# Patient Record
Sex: MALE | Race: ASIAN | NOT HISPANIC OR LATINO | URBAN - METROPOLITAN AREA
[De-identification: names, ages, dates, MRNs, and addresses within clinical notes are randomized per-mention and may not be internally consistent; named-entity substitution may affect disease eponyms.]

---

## 2018-01-08 ENCOUNTER — EMERGENCY (EMERGENCY)
Facility: HOSPITAL | Age: 43
LOS: 1 days | Discharge: HOME | End: 2018-01-08
Attending: EMERGENCY MEDICINE | Admitting: EMERGENCY MEDICINE
Payer: SELF-PAY

## 2018-01-08 VITALS
WEIGHT: 214.95 LBS | TEMPERATURE: 98 F | SYSTOLIC BLOOD PRESSURE: 131 MMHG | OXYGEN SATURATION: 97 % | HEART RATE: 70 BPM | RESPIRATION RATE: 16 BRPM | DIASTOLIC BLOOD PRESSURE: 85 MMHG

## 2018-01-08 VITALS
SYSTOLIC BLOOD PRESSURE: 135 MMHG | OXYGEN SATURATION: 97 % | RESPIRATION RATE: 17 BRPM | HEART RATE: 61 BPM | DIASTOLIC BLOOD PRESSURE: 91 MMHG | TEMPERATURE: 99 F

## 2018-01-08 PROCEDURE — 99284 EMERGENCY DEPT VISIT MOD MDM: CPT | Mod: 25

## 2018-01-08 PROCEDURE — 93005 ELECTROCARDIOGRAM TRACING: CPT

## 2018-01-08 PROCEDURE — 93010 ELECTROCARDIOGRAM REPORT: CPT

## 2018-01-08 PROCEDURE — 99283 EMERGENCY DEPT VISIT LOW MDM: CPT | Mod: 25

## 2018-01-08 RX ORDER — GABAPENTIN 400 MG/1
1 CAPSULE ORAL
Qty: 21 | Refills: 0 | OUTPATIENT
Start: 2018-01-08 | End: 2018-01-14

## 2018-01-08 RX ORDER — OMEPRAZOLE 10 MG/1
0 CAPSULE, DELAYED RELEASE ORAL
Qty: 0 | Refills: 0 | COMMUNITY

## 2018-01-08 RX ORDER — GABAPENTIN 400 MG/1
300 CAPSULE ORAL ONCE
Qty: 0 | Refills: 0 | Status: COMPLETED | OUTPATIENT
Start: 2018-01-08 | End: 2018-01-08

## 2018-01-08 RX ORDER — LISINOPRIL 2.5 MG/1
0 TABLET ORAL
Qty: 0 | Refills: 0 | COMMUNITY

## 2018-01-08 RX ADMIN — GABAPENTIN 300 MILLIGRAM(S): 400 CAPSULE ORAL at 16:39

## 2018-01-08 RX ADMIN — Medication 60 MILLIGRAM(S): at 16:39

## 2018-01-08 NOTE — ED ADULT NURSE NOTE - CHPI ED SYMPTOMS NEG
no blurred vision/no weakness/no confusion/no loss of consciousness/no nausea/no dizziness/no fever/no vomiting/no change in level of consciousness

## 2018-01-08 NOTE — ED PROVIDER NOTE - OBJECTIVE STATEMENT
41 y/o M 41 y/o M hx of HTN, DM2 presents with right shoulder and anterior chest discomfort of 10 day duration. Patient denies any inciting precipitant or trauma; no surgeries. Describes discomfort as 'pin and needle' paresthesias originating from right shoulder and radiating to proximal humerus and anterior chest. He recently saw a neurologist who recommended an MRI as source of discomfort could be due to brachial plexus irritation. He denies any f/c/sob/cp/n/v/diaphoresis, neck pain, dizziness, HA, limb weakness, or syncope. Of note he has had similar chest discomfort in past for which he underwent stress test that was normal 6 mo. ago. Denies early family hx of cardiac disease, but does smoke tobacco occasionally. 43 y/o M hx of HTN, DM2 presents with right shoulder and anterior chest discomfort of 10 day duration. Patient denies any inciting precipitant or trauma; no surgeries. Describes discomfort as 'pin and needle' paresthesias originating from right shoulder and radiating to proximal humerus and anterior chest. He recently saw a neurologist who recommended an MRI as source of discomfort could be due to brachial plexus irritation. He denies any f/c/sob/cp/n/v/diaphoresis, neck pain, dizziness, HA, limb weakness, or syncope. Of note he has had similar chest discomfort in past for which he underwent stress test that was normal 6 mo. ago. Denies early family hx of cardiac disease, but does smoke tobacco occasionally.       Attending note. Patient was seen in fast track from #2. Agree with above. Patient is complaining of greater than one week of burning-type pain in the right anterior chest to right shoulder right scapular region. Patient denies any acute injury or trauma. Patient denies any numbness or paresthesia. Patient has no neck pain. Patient was seen by neurology an MRI was ordered.

## 2018-01-08 NOTE — ED PROVIDER NOTE - MEDICAL DECISION MAKING DETAILS
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 Chest discomfort unlikely cardiac in origin; will obtain EKG, pain control, neurology f/u w/MRI. 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 Chest discomfort unlikely cardiac in origin; will obtain EKG, pain control, neurology f/u w/MRI. Discharge with gabapentin and short course of steroids. 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 Chest discomfort unlikely cardiac in origin; will obtain EKG, pain control, neurology f/u w/MRI. Discharge with gabapentin and short course of steroids.       Attending note-right chest and right shoulder neuropathic pain differential diagnosis includes cervical radiculopathy, thoracic outlet syndrome or other impingement on the brachioplexus. Recommend gabapentin and short course of steroids. Patient will reschedule his MRI appointment which he received from his neurologist

## 2018-01-08 NOTE — ED ADULT NURSE NOTE - OBJECTIVE STATEMENT
42 year old male presented to ED with c/o of pain in right shoulder starting 10 days ago but has been getting progressively worse. Pt denies falls/injury/trauma. Pt states he has numbness/tingling in both hands. Palpable pulses in all four extremities. Equal  strength, neuro intact. Pt stated he has chest discomfort - EKG completed handed to MD. Pt denies SOB, nausea/vomiting, fever, cough, chills, dizziness, headache. Pt a&ox3. Pt currently resting in bed comfortably with side rails up for safety. Will continue to monitor and assess while offering support and reassurance.

## 2018-01-08 NOTE — ED PROVIDER NOTE - PHYSICAL EXAMINATION
GENERAL: middle aged male in no acute distress   HEAD:  Atraumatic, Normocephalic  EYES: EOMI, PERRLA, conjunctiva and sclera clear  NECK: Supple, No JVD  CHEST/LUNG: Clear to auscultation bilaterally; No wheeze  HEART: Regular rate and rhythm; No murmurs, rubs, or gallops  ABDOMEN: Soft, Nontender, Nondistended; Bowel sounds present  EXTREMITIES:  2+ Peripheral Pulses, No clubbing, cyanosis, or edema  PSYCH: AAOx3  NEUROLOGY: CN II-XII grossly intact; normal UE motor strength. C7-T2 sensory abnormalities.   SKIN: No rashes or lesions GENERAL: middle aged male in no acute distress   HEAD:  Atraumatic, Normocephalic  EYES: EOMI, PERRLA, conjunctiva and sclera clear  NECK: Supple, No JVD  CHEST/LUNG: Clear to auscultation bilaterally; No wheeze  HEART: Regular rate and rhythm; No murmurs, rubs, or gallops  ABDOMEN: Soft, Nontender, Nondistended; Bowel sounds present  EXTREMITIES:  2+ Peripheral Pulses, No clubbing, cyanosis, or edema  PSYCH: AAOx3  NEUROLOGY: CN II-XII grossly intact; normal UE motor strength. C7-T2 sensory abnormalities.   SKIN: No rashes or lesions       Attending note. Patient is alert and in moderate distress. Her neck is supple and nontender. Range of motion of his back does not reproduce patient's symptoms. There is no tenderness over the brachial plexus. The patient has full range of motion of the upper extremities and shoulder with our reduction of the symptoms. Emma test is negative. Distal pulses are normal. Patient has a full range of motion of his neck with going sensation in the right chest with neck extension. Deep tendon reflexes are +1/4 equal and symmetrical.
